# Patient Record
Sex: MALE | Race: WHITE
[De-identification: names, ages, dates, MRNs, and addresses within clinical notes are randomized per-mention and may not be internally consistent; named-entity substitution may affect disease eponyms.]

---

## 2018-01-23 ENCOUNTER — HOSPITAL ENCOUNTER (OUTPATIENT)
Dept: HOSPITAL 62 - RAD | Age: 83
End: 2018-01-23
Attending: SURGERY
Payer: MEDICARE

## 2018-01-23 DIAGNOSIS — I71.4: Primary | ICD-10-CM

## 2018-01-23 PROCEDURE — 82565 ASSAY OF CREATININE: CPT

## 2019-03-22 ENCOUNTER — HOSPITAL ENCOUNTER (OUTPATIENT)
Dept: HOSPITAL 62 - RAD | Age: 84
End: 2019-03-22
Attending: SURGERY
Payer: MEDICARE

## 2019-03-22 DIAGNOSIS — Z87.891: ICD-10-CM

## 2019-03-22 DIAGNOSIS — Z13.6: Primary | ICD-10-CM

## 2019-03-22 DIAGNOSIS — I71.4: ICD-10-CM

## 2019-03-22 DIAGNOSIS — Z84.89: ICD-10-CM

## 2019-03-22 PROCEDURE — 76706 US ABDL AORTA SCREEN AAA: CPT

## 2019-03-22 NOTE — RADIOLOGY REPORT (SQ)
EXAM DESCRIPTION:  U/S ABD AORTIC SCREENING



COMPLETED DATE/TIME:  3/22/2019 9:34 am



REASON FOR STUDY:  Z13.6 ENCOUNTER FOR SCREENING FOR CARDIOVASCULAR DISORDERS I71.4  ABDOMINAL AORTIC
 ANEURYSM, WITHOUT RUPTURE Z13.6  ENCOUNTER FOR SCREENING FOR CARDIOVASCULAR DISORDERS



COMPARISON:  None.



TECHNIQUE:  Static and dynamic grayscale images acquired of the aorta and stored on PACs. Selected co
alan Doppler and spectral images recorded.



LIMITATIONS:  None.



FINDINGS:  AORTIC CALIBER MAXIMAL

PROXIMAL: 2.8 x 2.5 cm.

MID: 3.1 x 2.6 cm.

DISTAL: 2.8 x 3.0 cm.

ILIAC DIAMETER

RIGHT: 1.5 x 1.2 cm.

LEFT: 1.3 x 1.3 cm.

OTHER: No other significant finding.



IMPRESSION:  3.1 cm infrarenal abdominal aortic aneurysm.



COMMENT:   AAA Size:            Follow-up Recommendation

3.0-3.4 cm Every 3 years_____________________________________________________________________________
________

*Based upon the Society for Vascular Surgery Guidelines: J Vasc Surg. 2009 Oct;50(4 Suppl):S2-49

*For aortas of maximum diameter of 2.6-2.9 cm meeting the criteria for AAA (?1.5 x proximal normal se
gment)



TECHNICAL DOCUMENTATION:  JOB ID:  [id number]

 2011 [employer]- All Rights Reserved



Reading location - IP/workstation name: [email or web address]